# Patient Record
Sex: FEMALE | URBAN - METROPOLITAN AREA
[De-identification: names, ages, dates, MRNs, and addresses within clinical notes are randomized per-mention and may not be internally consistent; named-entity substitution may affect disease eponyms.]

---

## 2017-09-25 ENCOUNTER — THERAPY VISIT (OUTPATIENT)
Dept: PHYSICAL THERAPY | Facility: CLINIC | Age: 72
End: 2017-09-25
Payer: MEDICARE

## 2017-09-25 DIAGNOSIS — N39.46 MIXED INCONTINENCE URGE AND STRESS (MALE)(FEMALE): ICD-10-CM

## 2017-09-25 DIAGNOSIS — N81.89 PELVIC FLOOR WEAKNESS IN FEMALE: ICD-10-CM

## 2017-09-25 DIAGNOSIS — M62.89 PFD (PELVIC FLOOR DYSFUNCTION): Primary | ICD-10-CM

## 2017-09-25 PROCEDURE — G8990 OTHER PT/OT CURRENT STATUS: HCPCS | Mod: GP | Performed by: PHYSICAL THERAPIST

## 2017-09-25 PROCEDURE — 97161 PT EVAL LOW COMPLEX 20 MIN: CPT | Mod: GP | Performed by: PHYSICAL THERAPIST

## 2017-09-25 PROCEDURE — G8991 OTHER PT/OT GOAL STATUS: HCPCS | Mod: GP | Performed by: PHYSICAL THERAPIST

## 2017-09-25 PROCEDURE — 97110 THERAPEUTIC EXERCISES: CPT | Mod: GP | Performed by: PHYSICAL THERAPIST

## 2017-09-25 PROCEDURE — 97530 THERAPEUTIC ACTIVITIES: CPT | Mod: GP | Performed by: PHYSICAL THERAPIST

## 2017-09-25 NOTE — PROGRESS NOTES
[facility] Initial Evaluation      Subjective:    Patient is a 72 year old female presenting with rehab pelvic hpi. The history is provided by the patient.   Anna Sharma is a 72 year old female with a incontinence and pelvic dysfunction condition.  Condition occurred with:  Insidious onset.  Condition occurred: for unknown reasons.  This is a chronic condition  Pt is here d/t complaints of urinary leakage and urinary frequency that she has been having for 3+ yrs, but she feels that it's been worsening in the last yr or so.  She did consult with the urologist on 8/10/17 and was referred to PT for biofeedback training.  She notes that she has gone from using a light panty shield for protection to a #2 Poise pad. She can go 24 hr using the same pad.  She has gushes/spurts when she leaks, and she will have leaking with coughs, sneezes, laughing, and urges. She voids about every 1-2 hr during the day; at night she will void about 3-6 times.  She has had constipation in the past, but does have it under control now.  She is planning to travel to [place] in Jan 2018, and would like to be able to control her bladder better by then..    Site of Pain: none.           Symptoms are exacerbated by sneezing, coughing and laughing Relieved by: being relaxed.  Since onset symptoms are unchanged.        General health as reported by patient is good.  Pertinent medical history includes:  Osteoarthritis, osteoporosis, thyroid problems and asthma.  Medical allergies: none.  Other surgeries include:  Orthopedic surgery and other (both knees, rectal repair 22 yr ago).  Current medications:  Thyroid medication and other (inhaler, aspirin, cholesterol med).  Current occupation is retired.    Primary job tasks include:  Prolonged sitting, lifting and driving (household chores).    Barriers include:  None as reported by the patient.    Red flags:  None as reported by the patient.                         Objective:    System                                 Pelvic Dysfunction Evaluation:    Bladder/Pelvic Problems:    Storage Problem:  Frequency, mixed incontinence and nocturia        Diagnostic Tests:        Post Void Residual:  Done                  Flexibility:  NA      Abdominal Wall:  NA        Pelvic Clock Exam:    Ischiocavernosis pain:  -  Bulbocavernosis pain:  -  Transverse Perineal:  -  Levator ANI:  -  Perineal Body:  -    Reflex Testing:  normal    External Assessment:    Skin Condition:  Irritation and atrophic    Bearing Down/Coughing:  Normal  Tissue Symmetry:  Normal  Introitus:  Normal  Muscle Contraction/Perineal Mobility:  Slight lift, no urogential triangle descent  Internal Assessment:    Sensory Exam:  Normal  Contraction/Grade:  Fair squeeze, definite lift (3)  Accessory Muscle use-Abdominals:  None  Accessory Muscle use-Gluteals:  None  Accessory Muscle use-Adductors:  None  Symmetry of Contraction Response:  Equal  SEMG Biofeedback:    Equipment:  MR-10 unit    Suraface electrode placement--Perianal:  Bilaterally  Baseline EMG PM:  0.5 uV    Peak pelvic muscle contraction:  3.8 uV  Sustained contraction:  Steady holds w/abnormal breathing, avg of 3 uV (3.5 for phasic contractions)  EMG interpretation to fatigue:  8-10 seconds  Position:  SupineAdditional History:  Delivery History:  Vaginal delivery  Number of Pregnancies: 2  Number of Live Births: 2  Caffeine Consumption:  1 cup/day                     General     ROS    Assessment/Plan:      Patient is a 72 year old female with pelvic complaints.    Patient has the following significant findings with corresponding treatment plan.                Diagnosis 1:  PF dysfunction w/urinary incontinence and urinary frequency  Decreased strength - therapeutic exercise and therapeutic activities  Impaired muscle performance - biofeedback and neuro re-education  Decreased function - therapeutic activities    Therapy Evaluation Codes:   1) History  comprised of:   Personal factors that impact the plan of care:      Anxiety, Past/current experiences and Time since onset of symptoms.    Comorbidity factors that impact the plan of care are:      Asthma and Osteoarthritis.     Medications impacting care: None.  2) Examination of Body Systems comprised of:   Body structures and functions that impact the plan of care:      Pelvis.   Activity limitations that impact the plan of care are:      Frequency, Stress incontinence and Urge incontinence.  3) Clinical presentation characteristics are:   Stable/Uncomplicated.  4) Decision-Making    Low complexity using standardized patient assessment instrument and/or measureable assessment of functional outcome.  Cumulative Therapy Evaluation is: Low complexity.    Previous and current functional limitations:  (See Goal Flow Sheet for this information)    Short term and Long term goals: (See Goal Flow Sheet for this information)     Communication ability:  Patient appears to be able to clearly communicate and understand verbal and written communication and follow directions correctly.  Treatment Explanation - The following has been discussed with the patient:   RX ordered/plan of care  Anticipated outcomes  Possible risks and side effects  This patient would benefit from PT intervention to resume normal activities.   Rehab potential is good.    Frequency:  1 X week, once daily  Duration:  for 5 weeks tapering to 2 X a month over 2 months  Discharge Plan:  Achieve all LTG.  Independent in home treatment program.  Reach maximal therapeutic benefit.    Please refer to the daily flowsheet for treatment today, total treatment time and time spent performing 1:1 timed codes.

## 2017-09-25 NOTE — MR AVS SNAPSHOT
After Visit Summary   9/25/2017    Anna Sharma    MRN: [id number]           Patient Information     Date Of Birth          1945        Visit Information        Provider Department      9/25/2017 10:20 AM Lorena Houston PT [facility]        Today's Diagnoses     PFD (pelvic floor dysfunction)    -  1    Pelvic floor weakness in female        Mixed incontinence urge and stress (male)(female)           Follow-ups after your visit        Your next 10 appointments already scheduled     Oct 02, 2017 11:00 AM CDT   [facility] For Women Only with Lorena Houston, [facility] ([facility]  )    [address]  [place] [address]   [phone]            Oct 09, 2017 11:00 AM CDT   [facility] Only with Lorena Houston, [facility] ([facility]  )    [address]  [place] [address]   [phone]            Oct 16, 2017 11:00 AM CDT   [facility] Only with Lorena Houston, [facility] ([facility]  )    [address]  [place] [address]   [phone]            Oct 23, 2017 11:00 AM CDT   [facility] Only with Lorena Houston, [facility] ([facility]  )    [address]  [place] [address]   [phone]              Who to contact     If you have questions or need follow up information about today's clinic visit or your schedule please contact [facility] directly at [phone].  Normal or non-critical lab and imaging results will be communicated to you by MyChart, letter or phone within 4 business days after the clinic has received the results. If you do not hear from us within 7 days, please contact the clinic through MyChart or phone. If you have a critical or abnormal lab result, we will notify you  "by phone as soon as possible.  Submit refill requests through [employer] or call your pharmacy and they will forward the refill request to us. Please allow 3 business days for your refill to be completed.          Additional Information About Your Visit        MICMALIharMatcha Information     [employer] lets you send messages to your doctor, view your test results, renew your prescriptions, schedule appointments and more. To sign up, go to [email or web address] . Click on \"Log in\" on the left side of the screen, which will take you to the Welcome page. Then click on \"Sign up Now\" on the right side of the page.     You will be asked to enter the access code listed below, as well as some personal information. Please follow the directions to create your username and password.     Your access code is: [email or web address]  Expires: 2017 12:06 PM     Your access code will  in 90 days. If you need help or a new code, please call your [place] clinic or [phone].        Care EveryWhere ID     This is your Care EveryWhere ID. This could be used by other organizations to access your [place] medical records  [id number]         Blood Pressure from Last 3 Encounters:   No data found for BP    Weight from Last 3 Encounters:   No data found for Wt              We Performed the Following     HC PT EVAL, LOW COMPLEXITY     [facility] CERT REPORT     THERAPEUTIC ACTIVITIES     THERAPEUTIC EXERCISES        Primary Care Provider    None Specified       No primary provider on file.        Equal Access to Services     GINA TORIBIO : Hadtoney Barreto, waaxda [place], qaybta irenealzeny mason . So [facility] [phone].    ATENCIÓN: Si habla español, tiene a burns disposición servicios gratuitos de asistencia lingüística. Lakia al [phone].    We comply with applicable federal civil rights laws and [place] laws. We do not discriminate on the basis of race, color, national origin, age, " disability sex, sexual orientation or gender identity.            Thank you!     Thank you for choosing [facility]  for your care. Our goal is always to provide you with excellent care. Hearing back from our patients is one way we can continue to improve our services. Please take a few minutes to complete the written survey that you may receive in the mail after your visit with us. Thank you!             Your Updated Medication List - Protect others around you: Learn how to safely use, store and throw away your medicines at www.disposemymeds.org.      Notice  As of 9/25/2017 12:06 PM    You have not been prescribed any medications.

## 2017-09-25 NOTE — LETTER
DEPARTMENT OF HEALTH AND HUMAN SERVICES  CENTERS FOR MEDICARE & MEDICAID SERVICES    PLAN/UPDATED PLAN OF PROGRESS FOR OUTPATIENT REHABILITATION    PATIENTS NAME:  Anna Sharma   : 1945  PROVIDER NUMBER:    [id number]  [id number]   PROVIDER NAME: [facility]ILLIAN ORTIZ  MEDICAL RECORD NUMBER: [id number]  START OF CARE DATE:  SOC Date: 17   TYPE:  PT  PRIMARY/TREATMENT DIAGNOSIS: (Pertinent Medical Diagnosis)  PFD (pelvic floor dysfunction)  Pelvic floor weakness in female  Mixed incontinence urge and stress (male)(female)  VISITS FROM START OF CARE:  Rxs Used: 1     [place] [facility] Initial Evaluation  Subjective:  Patient is a 72 year old female presenting with rehab pelvic hpi. The history is provided by the patient.   Anna Sharma is a 72 year old female with a incontinence and pelvic dysfunction condition.  Condition occurred with:  Insidious onset.  Condition occurred: for unknown reasons.  This is a chronic condition  Pt is here d/t complaints of urinary leakage and urinary frequency that she has been having for 3+ yrs, but she feels that it's been worsening in the last yr or so.  She did consult with the urologist on 8/10/17 and was referred to PT for biofeedback training.  She notes that she has gone from using a light panty shield for protection to a #2 Poise pad. She can go 24 hr using the same pad.  She has gushes/spurts when she leaks, and she will have leaking with coughs, sneezes, laughing, and urges. She voids about every 1-2 hr during the day; at night she will void about 3-6 times.  She has had constipation in the past, but does have it under control now.  She is planning to travel to [place] in 2018, and would like to be able to control her bladder better by then..    Site of Pain: none.           Symptoms are exacerbated by sneezing, coughing and laughing Relieved by: being relaxed.  Since onset symptoms are unchanged.        General  health as reported by patient is good.  Pertinent medical history includes:  Osteoarthritis, osteoporosis, thyroid problems and asthma.  Medical allergies: none.  Other surgeries include:  Orthopedic surgery and other (both knees, rectal repair 22 yr ago).  Current medications:  Thyroid medication and other (inhaler, aspirin, cholesterol med).  Current occupation is retired.    Primary job tasks include:  Prolonged sitting, lifting and driving (household chores).  Barriers include:  None as reported by the patient.  Red flags:  None as reported by the patient.  Objective:  System  Pelvic Dysfunction Evaluation:    Bladder/Pelvic Problems:    Storage Problem:  Frequency, mixed incontinence and nocturia  Diagnostic Tests:    Post Void Residual:  Done  Flexibility:  NA  Abdominal Wall:  NA  Pelvic Clock Exam:    Ischiocavernosis pain:  -  Bulbocavernosis pain:  -  Transverse Perineal:  -  Levator ANI:  -  Perineal Body:  -                 PATIENTS NAME:  Anna Sharma             : 1945  Reflex Testing:  normal  External Assessment:    Skin Condition:  Irritation and atrophic  Bearing Down/Coughing:  Normal  Tissue Symmetry:  Normal  Introitus:  Normal  Muscle Contraction/Perineal Mobility:  Slight lift, no urogential triangle descent  Internal Assessment:    Sensory Exam:  Normal  Contraction/Grade:  Fair squeeze, definite lift (3)  Accessory Muscle use-Abdominals:  None      Accessory Muscle use-Gluteals:  None  Accessory Muscle use-Adductors:  None  Symmetry of Contraction Response:  Equal  SEMG Biofeedback:    Equipment:  MR-10 unit  Suraface electrode placement--Perianal:  Bilaterally  Baseline EMG PM:  0.5 uV  Peak pelvic muscle contraction:  3.8 uV  Sustained contraction:  Steady holds w/abnormal breathing, avg of 3 uV (3.5 for phasic contractions)  EMG interpretation to fatigue:  8-10 seconds  Position:  SupineAdditional History:  Delivery History:  Vaginal delivery  Number of Pregnancies: 2  Number of  Live Births: 2  Caffeine Consumption:  1 cup/day     Assessment/Plan:    Patient is a 72 year old female with pelvic complaints.    Patient has the following significant findings with corresponding treatment plan.                Diagnosis 1:  PF dysfunction w/urinary incontinence and urinary frequency  Decreased strength - therapeutic exercise and therapeutic activities  Impaired muscle performance - biofeedback and neuro re-education  Decreased function - therapeutic activities    Therapy Evaluation Codes:   1) History comprised of:   Personal factors that impact the plan of care:      Anxiety, Past/current experiences and Time since onset of symptoms.    Comorbidity factors that impact the plan of care are:      Asthma and Osteoarthritis.     Medications impacting care: None.  2) Examination of Body Systems comprised of:   Body structures and functions that impact the plan of care:      Pelvis.   Activity limitations that impact the plan of care are:      Frequency, Stress incontinence and Urge incontinence.  3) Clinical presentation characteristics are:   Stable/Uncomplicated.  4) Decision-Making    Low complexity using standardized patient assessment instrument and/or measureable assessment of functional outcome.  Cumulative Therapy Evaluation is: Low complexity.    Previous and current functional limitations:  (See Goal Flow Sheet for this information)    Short term and Long term goals: (See Goal Flow Sheet for this information)                 PATIENTS NAME:  Anna Sharma             : 1945    Communication ability:  Patient appears to be able to clearly communicate and understand verbal and written communication and follow directions correctly.  Treatment Explanation - The following has been discussed with the patient:   RX ordered/plan of care  Anticipated outcomes  Possible risks and side effects  This patient would benefit from PT intervention to resume normal activities.   Rehab potential is  "good.    Frequency:  1 X week, once daily  Duration:  for 5 weeks tapering to 2 X a month over 2 months  Discharge Plan:  Achieve all LTG.  Independent in home treatment program.  Reach maximal therapeutic benefit.    Please refer to the daily flowsheet for treatment today, total treatment time and time spent performing 1:1 timed codes.     Caregiver Signature/Credentials _____________________________________ Date ________        TINO Nino   I have reviewed and certified the need for these services and plan of treatment while under my care.      PHYSICIAN'S SIGNATURE:   _____________________________________  Date___________     Karen Vee MD    Certification period:  Beginning of Cert date period: 09/25/17 to  End of Cert period date: 12/23/17     Functional Level Progress Report: Please see attached \"Goal Flow sheet for Functional level.\"    ____X____ Continue Services or       ________ DC Services                Service dates: From  SOC Date: 09/25/17 date to present                         "

## 2017-10-02 ENCOUNTER — THERAPY VISIT (OUTPATIENT)
Dept: PHYSICAL THERAPY | Facility: CLINIC | Age: 72
End: 2017-10-02
Payer: MEDICARE

## 2017-10-02 DIAGNOSIS — N81.89 PELVIC FLOOR WEAKNESS IN FEMALE: ICD-10-CM

## 2017-10-02 DIAGNOSIS — M62.89 PFD (PELVIC FLOOR DYSFUNCTION): ICD-10-CM

## 2017-10-02 DIAGNOSIS — N39.46 MIXED INCONTINENCE URGE AND STRESS (MALE)(FEMALE): ICD-10-CM

## 2017-10-02 PROCEDURE — 97110 THERAPEUTIC EXERCISES: CPT | Mod: GP | Performed by: PHYSICAL THERAPIST

## 2017-10-09 ENCOUNTER — THERAPY VISIT (OUTPATIENT)
Dept: PHYSICAL THERAPY | Facility: CLINIC | Age: 72
End: 2017-10-09
Payer: MEDICARE

## 2017-10-09 DIAGNOSIS — M62.89 PFD (PELVIC FLOOR DYSFUNCTION): ICD-10-CM

## 2017-10-09 DIAGNOSIS — N81.89 PELVIC FLOOR WEAKNESS IN FEMALE: ICD-10-CM

## 2017-10-09 DIAGNOSIS — N39.46 MIXED INCONTINENCE URGE AND STRESS (MALE)(FEMALE): ICD-10-CM

## 2017-10-09 PROCEDURE — 97110 THERAPEUTIC EXERCISES: CPT | Mod: GP | Performed by: PHYSICAL THERAPIST

## 2017-10-09 PROCEDURE — 97112 NEUROMUSCULAR REEDUCATION: CPT | Mod: GP | Performed by: PHYSICAL THERAPIST

## 2017-10-16 ENCOUNTER — THERAPY VISIT (OUTPATIENT)
Dept: PHYSICAL THERAPY | Facility: CLINIC | Age: 72
End: 2017-10-16
Payer: MEDICARE

## 2017-10-16 DIAGNOSIS — M62.89 PFD (PELVIC FLOOR DYSFUNCTION): ICD-10-CM

## 2017-10-16 DIAGNOSIS — N81.89 PELVIC FLOOR WEAKNESS IN FEMALE: ICD-10-CM

## 2017-10-16 DIAGNOSIS — N39.46 MIXED INCONTINENCE URGE AND STRESS (MALE)(FEMALE): ICD-10-CM

## 2017-10-16 PROCEDURE — 97112 NEUROMUSCULAR REEDUCATION: CPT | Mod: GP | Performed by: PHYSICAL THERAPIST

## 2017-10-16 PROCEDURE — 97110 THERAPEUTIC EXERCISES: CPT | Mod: GP | Performed by: PHYSICAL THERAPIST

## 2017-10-27 ENCOUNTER — THERAPY VISIT (OUTPATIENT)
Dept: PHYSICAL THERAPY | Facility: CLINIC | Age: 72
End: 2017-10-27
Payer: MEDICARE

## 2017-10-27 DIAGNOSIS — N39.46 MIXED INCONTINENCE URGE AND STRESS (MALE)(FEMALE): ICD-10-CM

## 2017-10-27 DIAGNOSIS — M62.89 PFD (PELVIC FLOOR DYSFUNCTION): ICD-10-CM

## 2017-10-27 DIAGNOSIS — N81.89 PELVIC FLOOR WEAKNESS IN FEMALE: ICD-10-CM

## 2017-10-27 PROCEDURE — 97112 NEUROMUSCULAR REEDUCATION: CPT | Mod: GP | Performed by: PHYSICAL THERAPIST

## 2017-10-27 PROCEDURE — 97530 THERAPEUTIC ACTIVITIES: CPT | Mod: GP | Performed by: PHYSICAL THERAPIST

## 2017-10-27 PROCEDURE — 97110 THERAPEUTIC EXERCISES: CPT | Mod: GP | Performed by: PHYSICAL THERAPIST

## 2017-11-06 ENCOUNTER — THERAPY VISIT (OUTPATIENT)
Dept: PHYSICAL THERAPY | Facility: CLINIC | Age: 72
End: 2017-11-06
Payer: MEDICARE

## 2017-11-06 DIAGNOSIS — N81.89 PELVIC FLOOR WEAKNESS IN FEMALE: ICD-10-CM

## 2017-11-06 DIAGNOSIS — M62.89 PFD (PELVIC FLOOR DYSFUNCTION): ICD-10-CM

## 2017-11-06 DIAGNOSIS — N39.46 MIXED INCONTINENCE URGE AND STRESS (MALE)(FEMALE): ICD-10-CM

## 2017-11-06 PROCEDURE — 97110 THERAPEUTIC EXERCISES: CPT | Mod: GP | Performed by: PHYSICAL THERAPIST

## 2017-11-06 PROCEDURE — G8992 OTHER PT/OT  D/C STATUS: HCPCS | Mod: GP | Performed by: PHYSICAL THERAPIST

## 2017-11-06 PROCEDURE — 97530 THERAPEUTIC ACTIVITIES: CPT | Mod: GP | Performed by: PHYSICAL THERAPIST

## 2017-11-06 PROCEDURE — G8991 OTHER PT/OT GOAL STATUS: HCPCS | Mod: GP | Performed by: PHYSICAL THERAPIST

## 2017-11-06 NOTE — PROGRESS NOTES
Subjective:    HPI                    Objective:    System    Physical Exam    General     ROS    Assessment/Plan:      DISCHARGE REPORT    Progress reporting period is from 9/25/17 to 11/6/17.       SUBJECTIVE  Subjective changes noted by patient:   Pt notes that she is feeling like she is much better with her incontinence, feels more confident now that she can control the leaking. She remarked that she was even able to control it when she had a combination cough/sneeze and no leak. She is also only getting up 1x/night now to void.    Current pain level is  0/10.     Previous pain level was  0/10  .   Changes in function:  Yes (See Goal flowsheet attached for changes in current functional level)  Adverse reaction to treatment or activity: None    OBJECTIVE  Changes noted in objective findings:     PF strength is at functional levels; seated avg of 7-8 uV on biofeedback for both phasic and tonic contractions, good steady holds w/tonic contractions w/o substitutions or holding her breath.     ASSESSMENT/PLAN  Updated problem list and treatment plan: Diagnosis 1:  PF dysfunction w/urinary incontinence    STG/LTGs have been met or progress has been made towards goals:  Yes (See Goal flow sheet completed today.)  Assessment of Progress: The patient has met all of their long term goals.  Self Management Plans:  Patient has been instructed in a home treatment program.  Patient is independent in a home treatment program.  Patient  has been instructed in self management of symptoms.  Patient is independent in self management of symptoms.    Anna continues to require the following intervention to meet STG and LTG's:  PT intervention is no longer required to meet STG/LTG.    Recommendations:  This patient is ready to be discharged from therapy and continue their home treatment program.    Please refer to the daily flowsheet for treatment today, total treatment time and time spent performing 1:1 timed codes.

## 2017-11-06 NOTE — MR AVS SNAPSHOT
"              After Visit Summary   2017    Anna Sharma    MRN: [id number]           Patient Information     Date Of Birth          1945        Visit Information        Provider Department      2017 10:20 AM Lorena Houston PT [facility]        Today's Diagnoses     PFD (pelvic floor dysfunction)        Pelvic floor weakness in female        Mixed incontinence urge and stress (male)(female)           Follow-ups after your visit        Who to contact     If you have questions or need follow up information about today's clinic visit or your schedule please contact [place] [facility] directly at [phone].  Normal or non-critical lab and imaging results will be communicated to you by Shelfiehart, letter or phone within 4 business days after the clinic has received the results. If you do not hear from us within 7 days, please contact the clinic through Shelfiehart or phone. If you have a critical or abnormal lab result, we will notify you by phone as soon as possible.  Submit refill requests through [employer] or call your pharmacy and they will forward the refill request to us. Please allow 3 business days for your refill to be completed.          Additional Information About Your Visit        MyChart Information     [employer] lets you send messages to your doctor, view your test results, renew your prescriptions, schedule appointments and more. To sign up, go to [email or web address] . Click on \"Log in\" on the left side of the screen, which will take you to the Welcome page. Then click on \"Sign up Now\" on the right side of the page.     You will be asked to enter the access code listed below, as well as some personal information. Please follow the directions to create your username and password.     Your access code is: [email or web address]  Expires: 2017 11:06 AM     Your access code will  in 90 days. If you need help or a new code, please " call your [place] clinic or [phone].        Care EveryWhere ID     This is your Care EveryWhere ID. This could be used by other organizations to access your [place] medical records  [id number]         Blood Pressure from Last 3 Encounters:   No data found for BP    Weight from Last 3 Encounters:   No data found for Wt              We Performed the Following     [facility] PROGRESS NOTES REPORT     THERAPEUTIC ACTIVITIES     THERAPEUTIC EXERCISES        Primary Care Provider Office Phone # Fax #    Karen Vee [phone]       [facility] UROLOGY [address]  [facility] [address]        Equal Access to Services     [facility]: Hadii aad ku hadasho Soomaali, waaxda luqadaha, qaybta kaalmada adeegyada, waxay idiin hayaan adeeg benjie franz . So [facility] [phone].    ATENCIÓN: Si habla español, tiene a burns disposición servicios gratuitos de asistencia lingüística. [facility] [phone].    We comply with applicable federal civil rights laws and [place] laws. We do not discriminate on the basis of race, color, national origin, age, disability, sex, sexual orientation, or gender identity.            Thank you!     Thank you for choosing [place] [facility]  for your care. Our goal is always to provide you with excellent care. Hearing back from our patients is one way we can continue to improve our services. Please take a few minutes to complete the written survey that you may receive in the mail after your visit with us. Thank you!             Your Updated Medication List - Protect others around you: Learn how to safely use, store and throw away your medicines at www.disposemymeds.org.      Notice  As of 11/6/2017  2:39 PM    You have not been prescribed any medications.

## 2021-05-29 ENCOUNTER — RECORDS - HEALTHEAST (OUTPATIENT)
Dept: ADMINISTRATIVE | Facility: CLINIC | Age: 76
End: 2021-05-29